# Patient Record
Sex: FEMALE | Race: ASIAN | NOT HISPANIC OR LATINO | URBAN - METROPOLITAN AREA
[De-identification: names, ages, dates, MRNs, and addresses within clinical notes are randomized per-mention and may not be internally consistent; named-entity substitution may affect disease eponyms.]

---

## 2024-03-17 ENCOUNTER — EMERGENCY (EMERGENCY)
Facility: HOSPITAL | Age: 44
LOS: 1 days | Discharge: HOME | End: 2024-03-17
Attending: STUDENT IN AN ORGANIZED HEALTH CARE EDUCATION/TRAINING PROGRAM | Admitting: STUDENT IN AN ORGANIZED HEALTH CARE EDUCATION/TRAINING PROGRAM
Payer: MEDICAID

## 2024-03-17 VITALS
SYSTOLIC BLOOD PRESSURE: 157 MMHG | TEMPERATURE: 98 F | DIASTOLIC BLOOD PRESSURE: 92 MMHG | RESPIRATION RATE: 22 BRPM | OXYGEN SATURATION: 100 % | HEART RATE: 128 BPM

## 2024-03-17 PROCEDURE — 93010 ELECTROCARDIOGRAM REPORT: CPT

## 2024-03-17 PROCEDURE — 71046 X-RAY EXAM CHEST 2 VIEWS: CPT | Mod: 26

## 2024-03-17 PROCEDURE — 99285 EMERGENCY DEPT VISIT HI MDM: CPT

## 2024-03-17 RX ORDER — FAMOTIDINE 10 MG/ML
20 INJECTION INTRAVENOUS ONCE
Refills: 0 | Status: COMPLETED | OUTPATIENT
Start: 2024-03-17 | End: 2024-03-17

## 2024-03-17 RX ORDER — SODIUM CHLORIDE 9 MG/ML
1000 INJECTION, SOLUTION INTRAVENOUS ONCE
Refills: 0 | Status: COMPLETED | OUTPATIENT
Start: 2024-03-17 | End: 2024-03-17

## 2024-03-17 RX ADMIN — SODIUM CHLORIDE 1000 MILLILITER(S): 9 INJECTION, SOLUTION INTRAVENOUS at 23:35

## 2024-03-17 RX ADMIN — FAMOTIDINE 20 MILLIGRAM(S): 10 INJECTION INTRAVENOUS at 23:52

## 2024-03-17 NOTE — ED PROVIDER NOTE - ATTENDING APP SHARED VISIT CONTRIBUTION OF CARE
45 yo F hx HTN, HLD, DM2, presenting with complaints of chest pain, palpitations and dizziness today. Denies fevers, chills, nausea, vomiting, abdominal pain.     VITALS: reviewed  GEN: NAD, A & O x 4  HEAD/EYES: NCAT, EOMI, anicteric sclerae,   ENT: mucus membranes moist, oropharynx WNL, trachea midline,  RESP: lungs CTA with equal breath sounds bilaterally, chest wall nontender and atraumatic  CV: heart with reg rhythm S1, S2, distal pulses intact and symmetric bilaterally  ABDOMEN: normoactive bowel sounds, soft, nondistended, nontender, no palpable masses  : no CVAT  MSK: extremities atraumatic and nontender, no edema, no asymmetry.  SKIN: warm, dry, no rash, no bruising, no cyanosis. color appropriate for ethnicity  NEURO: alert, mentating appropriately, no facial asymmetry.   PSYCH: Affect appropriate    Low suspicion for ACS, no PE rf, no infectious symptoms or cough to suggest pna. Plan for labs (including lipase and troponin), cxr, ekg, ruq us r/o biliary obstruction, fluids, meds and reassess.

## 2024-03-17 NOTE — ED PROVIDER NOTE - OBJECTIVE STATEMENT
43 yo F with PMH of NIDDM T2, HTN, HLD, presents to ED c/o midsternal chest pain a/w palpitation and dizziness today. Reports symptoms started after eating fried lentil w/ onion at family dinner. States initially feeling unwell w/ dizziness w/ nausea, took orange juice and sugar w/ improvement. Reports having midsternal chest pain, constant, non-radiating, a/w sweating, palpitation and sob. Denies any aggravating factors. Pt denies any associated symptoms, including f/c/v, near syncope, cough, weakness, any recent trauma or injury to chest, lower extremity edema. Denies any prior history of DVT/PE, hx of malignancy or known hypercoagulable state. Denies any early family history of cardiac death or MI. Admits she had regular check up w/ cardiologist including EKG and ultrasound a few months ago.

## 2024-03-17 NOTE — ED PROVIDER NOTE - PATIENT PORTAL LINK FT
You can access the FollowMyHealth Patient Portal offered by [facility] by registering at the following website: [email or web address]. By joining [employer]’s FollowMyHealth portal, you will also be able to view your health information using other applications (apps) compatible with our system.

## 2024-03-17 NOTE — ED PROVIDER NOTE - PROGRESS NOTE DETAILS
PA LOGAN: Patient reassessed, sitting comfortably in chair in NAD, denies any complaints. States feeling better, symptoms improved. trop <6, <6, CXR unremarkable, lactate trending down after 2L bolus IVF, lipase wnl, bili wnl, LFTs wnl.  improved. Discussed with attending, Dr. Orlando, patient can be discharged home to f/u with PCP and cardiologist. The patient was given verbal and written discharge instructions. Specifically, instructions when to return to the ED and when to seek follow-up from their pcp was discussed. Any specialty follow-up was discussed, including how to make an appointment.  Instructions were discussed in simple, plain language and was understood by the patient. The patient understands that should their symptoms worsen or any new symptoms arise, they should return to the ED immediately for further evaluation. All pt's questions were answered. Patient verbalizes understanding.

## 2024-03-17 NOTE — ED ADULT TRIAGE NOTE - CHIEF COMPLAINT QUOTE
Pt with past history of DM type 2, and HTN comes in c/o CP, palpitation, SOB since an hour ago. HR is 128. Pt feels like she is going to pass out. Will obtain EKG.

## 2024-03-17 NOTE — ED PROVIDER NOTE - SEVERE SEPSIS ALERT DETAILS
pt appears to be dehydrated, fasting since Tuesday until today x6 days, also on Metformin. Pt has elevated lactate 4.7. Pt has no viral symptoms, no other complaints to suggest infectious etiology at this time. Will hydrate and repeat lactate.

## 2024-03-17 NOTE — ED PROVIDER NOTE - CLINICAL SUMMARY MEDICAL DECISION MAKING FREE TEXT BOX
43 yo F with PMH of NIDDM T2, HTN, HLD, presents to ED c/o midsternal chest pain a/w palpitation and dizziness today. well appearing female. EKG w/ sinus tachycardia. suspect possible GI etiology including biliary colic. The patient's risk factors for ACS were reviewed as well as the EKG.  The CXR assists in r/o Pneumonia, Pneumothorax, Esophageal Tears.  The patient does not appear to have a Pulmonary Embolism based on the Wells Score and there is no apparent DVT. There is no fever.  There does not appear to be an Aortic Dissection either based on history, physical exam, and signs. Plan: labs, trop, lipase, CXR, PEPCID for gas, and reassess for further imaging for RUQ, CDU vs cardiology follow up.

## 2024-03-17 NOTE — ED ADULT NURSE NOTE - OBJECTIVE STATEMENT
Pt endorses around 730pm last night, she started feeling palpations, w/ dizziness and diaphoresis. Pt is currently well appearing, nsr on monitor. Pt endorses around 730pm last night, she started feeling palpations, w/ dizziness and diaphoresis. Pt states she has been fasting. Pt is currently well appearing, nsr on monitor. no

## 2024-03-17 NOTE — ED ADULT NURSE NOTE - NSFALLUNIVINTERV_ED_ALL_ED
Bed/Stretcher in lowest position, wheels locked, appropriate side rails in place/Call bell, personal items and telephone in reach/Instruct patient to call for assistance before getting out of bed/chair/stretcher/Non-slip footwear applied when patient is off stretcher/[place] to call system/Physically safe environment - no spills, clutter or unnecessary equipment/Purposeful proactive rounding/Room/bathroom lighting operational, light cord in reach

## 2024-03-17 NOTE — ED PROVIDER NOTE - NSFOLLOWUPINSTRUCTIONS_ED_ALL_ED_FT
Rest, drink plenty of fluids.  Advance activity as tolerated.  Continue all previously prescribed medications as directed.  Follow up with your primary care physician and cardiologist in 48-72 hours- bring copies of your results. Call on referral list given for next available appointment. Return to the ER for worsening or persistent symptoms, and/or ANY NEW OR CONCERNING SYMPTOMS. If you have issues obtaining follow up, please call: [phone] to obtain a doctor or specialist who takes your insurance in your area.

## 2024-03-18 VITALS
OXYGEN SATURATION: 98 % | TEMPERATURE: 98 F | DIASTOLIC BLOOD PRESSURE: 78 MMHG | SYSTOLIC BLOOD PRESSURE: 121 MMHG | RESPIRATION RATE: 17 BRPM | HEART RATE: 90 BPM

## 2024-03-18 LAB
ALBUMIN SERPL ELPH-MCNC: 4.3 G/DL — SIGNIFICANT CHANGE UP (ref 3.3–5)
ALP SERPL-CCNC: 61 U/L — SIGNIFICANT CHANGE UP (ref 40–120)
ALT FLD-CCNC: 21 U/L — SIGNIFICANT CHANGE UP (ref 4–33)
ANION GAP SERPL CALC-SCNC: 13 MMOL/L — SIGNIFICANT CHANGE UP (ref 7–14)
APTT BLD: 22.9 SEC — LOW (ref 24.5–35.6)
AST SERPL-CCNC: 21 U/L — SIGNIFICANT CHANGE UP (ref 4–32)
BASE EXCESS BLDV CALC-SCNC: -3.6 MMOL/L — LOW (ref -2–3)
BASE EXCESS BLDV CALC-SCNC: -4.5 MMOL/L — LOW (ref -2–3)
BASOPHILS # BLD AUTO: 0.03 K/UL — SIGNIFICANT CHANGE UP (ref 0–0.2)
BASOPHILS NFR BLD AUTO: 0.3 % — SIGNIFICANT CHANGE UP (ref 0–2)
BILIRUB SERPL-MCNC: 0.4 MG/DL — SIGNIFICANT CHANGE UP (ref 0.2–1.2)
BLOOD GAS VENOUS COMPREHENSIVE RESULT: SIGNIFICANT CHANGE UP
BLOOD GAS VENOUS COMPREHENSIVE RESULT: SIGNIFICANT CHANGE UP
BUN SERPL-MCNC: 13 MG/DL — SIGNIFICANT CHANGE UP (ref 7–23)
CALCIUM SERPL-MCNC: 8.8 MG/DL — SIGNIFICANT CHANGE UP (ref 8.4–10.5)
CHLORIDE BLDV-SCNC: 102 MMOL/L — SIGNIFICANT CHANGE UP (ref 96–108)
CHLORIDE BLDV-SCNC: 107 MMOL/L — SIGNIFICANT CHANGE UP (ref 96–108)
CHLORIDE SERPL-SCNC: 103 MMOL/L — SIGNIFICANT CHANGE UP (ref 98–107)
CO2 BLDV-SCNC: 22.3 MMOL/L — SIGNIFICANT CHANGE UP (ref 22–26)
CO2 BLDV-SCNC: 23.4 MMOL/L — SIGNIFICANT CHANGE UP (ref 22–26)
CO2 SERPL-SCNC: 19 MMOL/L — LOW (ref 22–31)
CREAT SERPL-MCNC: 0.34 MG/DL — LOW (ref 0.5–1.3)
EGFR: 130 ML/MIN/1.73M2 — SIGNIFICANT CHANGE UP
EOSINOPHIL # BLD AUTO: 0.25 K/UL — SIGNIFICANT CHANGE UP (ref 0–0.5)
EOSINOPHIL NFR BLD AUTO: 2.1 % — SIGNIFICANT CHANGE UP (ref 0–6)
GAS PNL BLDV: 134 MMOL/L — LOW (ref 136–145)
GAS PNL BLDV: 136 MMOL/L — SIGNIFICANT CHANGE UP (ref 136–145)
GAS PNL BLDV: SIGNIFICANT CHANGE UP
GLUCOSE BLDV-MCNC: 198 MG/DL — HIGH (ref 70–99)
GLUCOSE BLDV-MCNC: 372 MG/DL — HIGH (ref 70–99)
GLUCOSE SERPL-MCNC: 358 MG/DL — HIGH (ref 70–99)
HCO3 BLDV-SCNC: 21 MMOL/L — LOW (ref 22–29)
HCO3 BLDV-SCNC: 22 MMOL/L — SIGNIFICANT CHANGE UP (ref 22–29)
HCT VFR BLD CALC: 34.1 % — LOW (ref 34.5–45)
HCT VFR BLDA CALC: 30 % — LOW (ref 34.5–46.5)
HCT VFR BLDA CALC: 33 % — LOW (ref 34.5–46.5)
HGB BLD CALC-MCNC: 11 G/DL — LOW (ref 11.7–16.1)
HGB BLD CALC-MCNC: 9.9 G/DL — LOW (ref 11.7–16.1)
HGB BLD-MCNC: 10.6 G/DL — LOW (ref 11.5–15.5)
IANC: 9.4 K/UL — HIGH (ref 1.8–7.4)
IMM GRANULOCYTES NFR BLD AUTO: 0.5 % — SIGNIFICANT CHANGE UP (ref 0–0.9)
INR BLD: <0.9 RATIO — SIGNIFICANT CHANGE UP (ref 0.85–1.18)
LACTATE BLDV-MCNC: 3.6 MMOL/L — HIGH (ref 0.5–2)
LACTATE BLDV-MCNC: 4.7 MMOL/L — CRITICAL HIGH (ref 0.5–2)
LIDOCAIN IGE QN: 23 U/L — SIGNIFICANT CHANGE UP (ref 7–60)
LYMPHOCYTES # BLD AUTO: 1.56 K/UL — SIGNIFICANT CHANGE UP (ref 1–3.3)
LYMPHOCYTES # BLD AUTO: 13.2 % — SIGNIFICANT CHANGE UP (ref 13–44)
MCHC RBC-ENTMCNC: 23.5 PG — LOW (ref 27–34)
MCHC RBC-ENTMCNC: 31.1 GM/DL — LOW (ref 32–36)
MCV RBC AUTO: 75.6 FL — LOW (ref 80–100)
MONOCYTES # BLD AUTO: 0.53 K/UL — SIGNIFICANT CHANGE UP (ref 0–0.9)
MONOCYTES NFR BLD AUTO: 4.5 % — SIGNIFICANT CHANGE UP (ref 2–14)
NEUTROPHILS # BLD AUTO: 9.4 K/UL — HIGH (ref 1.8–7.4)
NEUTROPHILS NFR BLD AUTO: 79.4 % — HIGH (ref 43–77)
NRBC # BLD: 0 /100 WBCS — SIGNIFICANT CHANGE UP (ref 0–0)
NRBC # FLD: 0 K/UL — SIGNIFICANT CHANGE UP (ref 0–0)
PCO2 BLDV: 40 MMHG — SIGNIFICANT CHANGE UP (ref 39–52)
PCO2 BLDV: 42 MMHG — SIGNIFICANT CHANGE UP (ref 39–52)
PH BLDV: 7.33 — SIGNIFICANT CHANGE UP (ref 7.32–7.43)
PH BLDV: 7.33 — SIGNIFICANT CHANGE UP (ref 7.32–7.43)
PLATELET # BLD AUTO: 356 K/UL — SIGNIFICANT CHANGE UP (ref 150–400)
PO2 BLDV: 49 MMHG — HIGH (ref 25–45)
PO2 BLDV: 55 MMHG — HIGH (ref 25–45)
POTASSIUM BLDV-SCNC: 4.3 MMOL/L — SIGNIFICANT CHANGE UP (ref 3.5–5.1)
POTASSIUM BLDV-SCNC: 4.4 MMOL/L — SIGNIFICANT CHANGE UP (ref 3.5–5.1)
POTASSIUM SERPL-MCNC: 4.8 MMOL/L — SIGNIFICANT CHANGE UP (ref 3.5–5.3)
POTASSIUM SERPL-SCNC: 4.8 MMOL/L — SIGNIFICANT CHANGE UP (ref 3.5–5.3)
PROT SERPL-MCNC: 7.2 G/DL — SIGNIFICANT CHANGE UP (ref 6–8.3)
PROTHROM AB SERPL-ACNC: 10 SEC — SIGNIFICANT CHANGE UP (ref 9.5–13)
RBC # BLD: 4.51 M/UL — SIGNIFICANT CHANGE UP (ref 3.8–5.2)
RBC # FLD: 14.9 % — HIGH (ref 10.3–14.5)
SAO2 % BLDV: 78 % — SIGNIFICANT CHANGE UP (ref 67–88)
SAO2 % BLDV: 83.3 % — SIGNIFICANT CHANGE UP (ref 67–88)
SODIUM SERPL-SCNC: 135 MMOL/L — SIGNIFICANT CHANGE UP (ref 135–145)
TROPONIN T, HIGH SENSITIVITY RESULT: <6 NG/L — SIGNIFICANT CHANGE UP
TROPONIN T, HIGH SENSITIVITY RESULT: <6 NG/L — SIGNIFICANT CHANGE UP
TSH SERPL-MCNC: 1.69 UIU/ML — SIGNIFICANT CHANGE UP (ref 0.27–4.2)
WBC # BLD: 11.83 K/UL — HIGH (ref 3.8–10.5)
WBC # FLD AUTO: 11.83 K/UL — HIGH (ref 3.8–10.5)

## 2024-03-18 RX ORDER — SODIUM CHLORIDE 9 MG/ML
1000 INJECTION, SOLUTION INTRAVENOUS ONCE
Refills: 0 | Status: COMPLETED | OUTPATIENT
Start: 2024-03-18 | End: 2024-03-18

## 2024-03-18 RX ADMIN — SODIUM CHLORIDE 1000 MILLILITER(S): 9 INJECTION, SOLUTION INTRAVENOUS at 00:39

## 2024-05-17 ENCOUNTER — EMERGENCY (EMERGENCY)
Facility: HOSPITAL | Age: 44
LOS: 1 days | Discharge: HOME | End: 2024-05-17
Attending: EMERGENCY MEDICINE | Admitting: EMERGENCY MEDICINE
Payer: MEDICAID

## 2024-05-17 VITALS
RESPIRATION RATE: 18 BRPM | WEIGHT: 151.9 LBS | DIASTOLIC BLOOD PRESSURE: 83 MMHG | HEART RATE: 80 BPM | OXYGEN SATURATION: 100 % | SYSTOLIC BLOOD PRESSURE: 134 MMHG | TEMPERATURE: 98 F

## 2024-05-17 PROCEDURE — 99284 EMERGENCY DEPT VISIT MOD MDM: CPT

## 2024-05-17 NOTE — ED ADULT TRIAGE NOTE - CHIEF COMPLAINT QUOTE
complaining 3x days of redness, swelling and " pus pimple" to L jaw. took tylenol within the hour. denies recent dental work, fevers, chills was given antibiotics with no relief  medical history DM2, HTN, HLD

## 2024-05-18 VITALS
TEMPERATURE: 99 F | HEART RATE: 88 BPM | DIASTOLIC BLOOD PRESSURE: 85 MMHG | SYSTOLIC BLOOD PRESSURE: 133 MMHG | RESPIRATION RATE: 16 BRPM | OXYGEN SATURATION: 100 %

## 2024-05-18 PROBLEM — I10 ESSENTIAL (PRIMARY) HYPERTENSION: Chronic | Status: ACTIVE | Noted: 2024-03-17

## 2024-05-18 PROBLEM — E11.9 TYPE 2 DIABETES MELLITUS WITHOUT COMPLICATIONS: Chronic | Status: ACTIVE | Noted: 2024-03-17

## 2024-05-18 PROBLEM — E78.5 HYPERLIPIDEMIA, UNSPECIFIED: Chronic | Status: ACTIVE | Noted: 2024-03-17

## 2024-05-18 RX ORDER — MUPIROCIN 20 MG/G
1 OINTMENT TOPICAL
Qty: 1 | Refills: 0
Start: 2024-05-18 | End: 2024-05-22

## 2024-05-18 RX ORDER — CEPHALEXIN 500 MG
1 CAPSULE ORAL
Qty: 20 | Refills: 0
Start: 2024-05-18 | End: 2024-05-22

## 2024-05-18 RX ORDER — CEPHALEXIN 500 MG
500 CAPSULE ORAL ONCE
Refills: 0 | Status: COMPLETED | OUTPATIENT
Start: 2024-05-18 | End: 2024-05-18

## 2024-05-18 RX ORDER — MUPIROCIN 20 MG/G
1 OINTMENT TOPICAL ONCE
Refills: 0 | Status: COMPLETED | OUTPATIENT
Start: 2024-05-18 | End: 2024-05-18

## 2024-05-18 RX ORDER — KETOROLAC TROMETHAMINE 30 MG/ML
15 SYRINGE (ML) INJECTION ONCE
Refills: 0 | Status: DISCONTINUED | OUTPATIENT
Start: 2024-05-18 | End: 2024-05-18

## 2024-05-18 RX ORDER — KETOROLAC TROMETHAMINE 30 MG/ML
30 SYRINGE (ML) INJECTION ONCE
Refills: 0 | Status: DISCONTINUED | OUTPATIENT
Start: 2024-05-18 | End: 2024-05-18

## 2024-05-18 RX ORDER — ACETAMINOPHEN 500 MG
650 TABLET ORAL ONCE
Refills: 0 | Status: COMPLETED | OUTPATIENT
Start: 2024-05-18 | End: 2024-05-18

## 2024-05-18 RX ADMIN — Medication 30 MILLIGRAM(S): at 01:17

## 2024-05-18 RX ADMIN — Medication 650 MILLIGRAM(S): at 01:22

## 2024-05-18 RX ADMIN — MUPIROCIN 1 APPLICATION(S): 20 OINTMENT TOPICAL at 01:43

## 2024-05-18 RX ADMIN — Medication 500 MILLIGRAM(S): at 01:16

## 2024-05-18 NOTE — ED PROVIDER NOTE - PROGRESS NOTE DETAILS
Ko Jackson MD PGY2: pt feels improved with meds. tolerates po well. discussed meds, dc, follow up, return precautions. understands and is amenable at this time. Graft Cartilage Fenestration Text: The cartilage was fenestrated with a 2mm punch biopsy to help facilitate graft survival and healing.

## 2024-05-18 NOTE — ED PROVIDER NOTE - OBJECTIVE STATEMENT
Patient is a 44-year-old female past medical history hypertension, hyperlipidemia, diabetes presenting for left lower lip infection.  Patient states for the past 3 days she has had redness and swelling to the left lateral aspect of her lower lip, with yellow crusting overlying it and some pus drainage.  Feels that it is painful, preventing her from trying to eat because of the pain.  No pain in the gums or teeth, no pain in the neck.  Does not feel that there is any swelling on the inside of her mouth or throat, no difficulty breathing or swallowing.  No fevers.  Went to her primary care doctor yesterday and was prescribed Augmentin and Flagyl, began taking it last night for total of 3 doses.  Feels that it is somewhat improved but still having significant pain.  No other cough, sore throat, sick contacts, recent travel, difficulty breathing, chest pain, abdominal pain, urine or stool change.  Took 1 tablet Tylenol prior to arrival with some improvement to pain.

## 2024-05-18 NOTE — ED PROVIDER NOTE - CLINICAL SUMMARY MEDICAL DECISION MAKING FREE TEXT BOX
Patient is a 44-year-old female past medical history hypertension, hyperlipidemia, diabetes presenting for left lower lip infection. With vital signs currently within normal limits and stable.  With 3 days left lower lip cellulitis with yellow crusting suggestive of gram-positive/staphylococcal infection.  No floor of mouth or other neck changes suggestive of Dao angina or other invasive infection, no gum changes or tooth changes.  To give pain control, antibiotics, reassess, likely DC with follow-up.

## 2024-05-18 NOTE — ED PROVIDER NOTE - PHYSICAL EXAMINATION
CONSTITUTIONAL: awake; in no acute distress.   SKIN: L lower lip erythema and yellow crusting, no palpable nodules.  HEAD: Normocephalic; atraumatic.  EYES: no conjunctival injection. PERRL.   ENT: No nasal discharge; airway clear. No gum or tooth changes. No oropharyngeal changes  NECK: Supple; non tender. Good rom. No overlying skin changes to neck or swelling.  CARD: S1, S2 normal; no murmurs, gallops, or rubs. Regular rate and rhythm.   RESP: No wheezes, rales or rhonchi. Good air movement bilaterally.   ABD: soft ntnd, no guarding, no distention, no rigidity.   EXT: Ambulates independently.  No cyanosis or edema.   LYMPH: No acute cervical adenopathy.  NEURO: Alert, oriented, grossly unremarkable  PSYCH: Cooperative, appropriate.

## 2024-05-18 NOTE — ED PROVIDER NOTE - ATTENDING CONTRIBUTION TO CARE
43 y/o F with h/o hyperlipidemia, HTN, DM p/w redness, swelling, discharge and pain to L lower lip.  Pt saw her PMD Thursday and was started on augmentin and flagyl.  Pt has taken abx x 1 day.  Cont to have pain to the site.  No new sxs.  No fever, sob, difficulty swallowing, voice changes, dental pain.    Well appearing, sitting comfortably in stretcher, awake and alert, nontoxic.  AF/VSS.  NCAT (+)swelling, induration, redness and tenderness to L lower buccal mucosa and lip with crusty yellow discharge.  No intraoral lesions, tongue elevation, sublingual swelling.  Post oropharynx is clear, uvula midline, no malocclusion or trismus.  No voice muffling.    Exam is suggestive of impetigo.  Will change abx to keflex and mupirocin topical, pain meds, cont warm compresses at home, outpatient f/u for wound check.

## 2024-05-18 NOTE — ED ADULT NURSE NOTE - OBJECTIVE STATEMENT
Received the patient in spot 24A with c/o redness, swelling and pimple like appearance on the left corner of lower lip. Patient c/o pain on this area. Not in acute distress. alert and oriented x 4, safety maintained. Not in acute distress. resps are even and non labored.  Meds given as per order.  Nursing care is ongoing.

## 2024-05-18 NOTE — ED PROVIDER NOTE - PATIENT PORTAL LINK FT
You can access the FollowMyHealth Patient Portal offered by [facility] by registering at the following website: [email or web address]. By joining [employer]’s FollowMyHealth portal, you will also be able to view your health information using other applications (apps) compatible with our system.

## 2024-05-18 NOTE — ED PROVIDER NOTE - NSFOLLOWUPINSTRUCTIONS_ED_ALL_ED_FT
You were seen in the ED for infection of the skin of your lip    Your evaluation showed no findings requiring further evaluation or treatment in the hospital at this time.    You were prescribed antibiotics for your infection.  Please take Keflex 1 tablet 4 times per day and finish your course even if you feel better.  Please apply the mupirocin ointment to the affected area 2-3 times per day for 5 days.    You may take Tylenol up to 1000mg every 6 hours as needed for pain.  You may take Ibuprofen up to 400mg every 6 hours as needed for pain.    Please follow up with your PCP as discussed within 1 week.  Discuss your symptoms and medications.    Seek immediate medical attention if you experience new or worsening symptoms.  This includes swelling or pain to your mouth or throat, difficulty breathing, inability to eat.

## 2024-10-22 ENCOUNTER — EMERGENCY (EMERGENCY)
Facility: HOSPITAL | Age: 44
LOS: 1 days | Discharge: HOME | End: 2024-10-22
Admitting: EMERGENCY MEDICINE
Payer: MEDICAID

## 2024-10-22 VITALS
SYSTOLIC BLOOD PRESSURE: 124 MMHG | TEMPERATURE: 98 F | OXYGEN SATURATION: 99 % | RESPIRATION RATE: 17 BRPM | DIASTOLIC BLOOD PRESSURE: 77 MMHG | HEART RATE: 76 BPM

## 2024-10-22 VITALS
TEMPERATURE: 98 F | HEIGHT: 60 IN | SYSTOLIC BLOOD PRESSURE: 136 MMHG | OXYGEN SATURATION: 98 % | RESPIRATION RATE: 15 BRPM | WEIGHT: 160.06 LBS | DIASTOLIC BLOOD PRESSURE: 85 MMHG | HEART RATE: 82 BPM

## 2024-10-22 LAB
ALBUMIN SERPL ELPH-MCNC: 4.1 G/DL — SIGNIFICANT CHANGE UP (ref 3.3–5)
ALP SERPL-CCNC: 62 U/L — SIGNIFICANT CHANGE UP (ref 40–120)
ALT FLD-CCNC: 22 U/L — SIGNIFICANT CHANGE UP (ref 4–33)
ANION GAP SERPL CALC-SCNC: 13 MMOL/L — SIGNIFICANT CHANGE UP (ref 7–14)
APPEARANCE UR: CLEAR — SIGNIFICANT CHANGE UP
AST SERPL-CCNC: 20 U/L — SIGNIFICANT CHANGE UP (ref 4–32)
BACTERIA # UR AUTO: NEGATIVE /HPF — SIGNIFICANT CHANGE UP
BASOPHILS # BLD AUTO: 0.03 K/UL — SIGNIFICANT CHANGE UP (ref 0–0.2)
BASOPHILS NFR BLD AUTO: 0.4 % — SIGNIFICANT CHANGE UP (ref 0–2)
BILIRUB SERPL-MCNC: 0.3 MG/DL — SIGNIFICANT CHANGE UP (ref 0.2–1.2)
BILIRUB UR-MCNC: NEGATIVE — SIGNIFICANT CHANGE UP
BUN SERPL-MCNC: 11 MG/DL — SIGNIFICANT CHANGE UP (ref 7–23)
CALCIUM SERPL-MCNC: 9.7 MG/DL — SIGNIFICANT CHANGE UP (ref 8.4–10.5)
CAST: 0 /LPF — SIGNIFICANT CHANGE UP (ref 0–4)
CHLORIDE SERPL-SCNC: 103 MMOL/L — SIGNIFICANT CHANGE UP (ref 98–107)
CO2 SERPL-SCNC: 21 MMOL/L — LOW (ref 22–31)
COLOR SPEC: YELLOW — SIGNIFICANT CHANGE UP
CREAT SERPL-MCNC: 0.38 MG/DL — LOW (ref 0.5–1.3)
DIFF PNL FLD: ABNORMAL
EGFR: 127 ML/MIN/1.73M2 — SIGNIFICANT CHANGE UP
EOSINOPHIL # BLD AUTO: 0.31 K/UL — SIGNIFICANT CHANGE UP (ref 0–0.5)
EOSINOPHIL NFR BLD AUTO: 3.9 % — SIGNIFICANT CHANGE UP (ref 0–6)
GLUCOSE SERPL-MCNC: 169 MG/DL — HIGH (ref 70–99)
GLUCOSE UR QL: 500 MG/DL
HCT VFR BLD CALC: 35.1 % — SIGNIFICANT CHANGE UP (ref 34.5–45)
HGB BLD-MCNC: 10.7 G/DL — LOW (ref 11.5–15.5)
IANC: 3.53 K/UL — SIGNIFICANT CHANGE UP (ref 1.8–7.4)
IMM GRANULOCYTES NFR BLD AUTO: 0.2 % — SIGNIFICANT CHANGE UP (ref 0–0.9)
KETONES UR-MCNC: NEGATIVE MG/DL — SIGNIFICANT CHANGE UP
LEUKOCYTE ESTERASE UR-ACNC: NEGATIVE — SIGNIFICANT CHANGE UP
LYMPHOCYTES # BLD AUTO: 3.59 K/UL — HIGH (ref 1–3.3)
LYMPHOCYTES # BLD AUTO: 44.8 % — HIGH (ref 13–44)
MCHC RBC-ENTMCNC: 23.1 PG — LOW (ref 27–34)
MCHC RBC-ENTMCNC: 30.5 GM/DL — LOW (ref 32–36)
MCV RBC AUTO: 75.8 FL — LOW (ref 80–100)
MONOCYTES # BLD AUTO: 0.53 K/UL — SIGNIFICANT CHANGE UP (ref 0–0.9)
MONOCYTES NFR BLD AUTO: 6.6 % — SIGNIFICANT CHANGE UP (ref 2–14)
NEUTROPHILS # BLD AUTO: 3.53 K/UL — SIGNIFICANT CHANGE UP (ref 1.8–7.4)
NEUTROPHILS NFR BLD AUTO: 44.1 % — SIGNIFICANT CHANGE UP (ref 43–77)
NITRITE UR-MCNC: NEGATIVE — SIGNIFICANT CHANGE UP
NRBC # BLD: 0 /100 WBCS — SIGNIFICANT CHANGE UP (ref 0–0)
NRBC # FLD: 0 K/UL — SIGNIFICANT CHANGE UP (ref 0–0)
PH UR: 6.5 — SIGNIFICANT CHANGE UP (ref 5–8)
PLATELET # BLD AUTO: 340 K/UL — SIGNIFICANT CHANGE UP (ref 150–400)
POTASSIUM SERPL-MCNC: 4.2 MMOL/L — SIGNIFICANT CHANGE UP (ref 3.5–5.3)
POTASSIUM SERPL-SCNC: 4.2 MMOL/L — SIGNIFICANT CHANGE UP (ref 3.5–5.3)
PROT SERPL-MCNC: 7.7 G/DL — SIGNIFICANT CHANGE UP (ref 6–8.3)
PROT UR-MCNC: NEGATIVE MG/DL — SIGNIFICANT CHANGE UP
RBC # BLD: 4.63 M/UL — SIGNIFICANT CHANGE UP (ref 3.8–5.2)
RBC # FLD: 15.1 % — HIGH (ref 10.3–14.5)
RBC CASTS # UR COMP ASSIST: 44 /HPF — HIGH (ref 0–4)
SODIUM SERPL-SCNC: 137 MMOL/L — SIGNIFICANT CHANGE UP (ref 135–145)
SP GR SPEC: 1.01 — SIGNIFICANT CHANGE UP (ref 1–1.03)
SQUAMOUS # UR AUTO: 1 /HPF — SIGNIFICANT CHANGE UP (ref 0–5)
UROBILINOGEN FLD QL: 0.2 MG/DL — SIGNIFICANT CHANGE UP (ref 0.2–1)
WBC # BLD: 8.01 K/UL — SIGNIFICANT CHANGE UP (ref 3.8–10.5)
WBC # FLD AUTO: 8.01 K/UL — SIGNIFICANT CHANGE UP (ref 3.8–10.5)
WBC UR QL: 0 /HPF — SIGNIFICANT CHANGE UP (ref 0–5)

## 2024-10-22 PROCEDURE — 99284 EMERGENCY DEPT VISIT MOD MDM: CPT

## 2024-10-22 RX ORDER — CYCLOBENZAPRINE HCL 10 MG
1 TABLET ORAL
Qty: 21 | Refills: 0
Start: 2024-10-22 | End: 2024-10-28

## 2024-10-22 RX ORDER — KETOROLAC TROMETHAMINE 10 MG/1
15 TABLET, FILM COATED ORAL ONCE
Refills: 0 | Status: DISCONTINUED | OUTPATIENT
Start: 2024-10-22 | End: 2024-10-22

## 2024-10-22 RX ORDER — LIDOCAINE 50 MG/G
1 CREAM TOPICAL ONCE
Refills: 0 | Status: COMPLETED | OUTPATIENT
Start: 2024-10-22 | End: 2024-10-22

## 2024-10-22 RX ADMIN — LIDOCAINE 1 PATCH: 50 CREAM TOPICAL at 21:39

## 2024-10-22 RX ADMIN — KETOROLAC TROMETHAMINE 15 MILLIGRAM(S): 10 TABLET, FILM COATED ORAL at 21:38

## 2024-10-22 NOTE — ED PROVIDER NOTE - PROGRESS NOTE DETAILS
PA LOGAN: labs reviewed. ua negative. Patient reassessed, sitting comfortably in chair in NAD, denies any complaints. States feeling better, symptoms improved. Pt is medically stable for discharge and follow up with PMD and orthopedic. The patient was given verbal and written discharge instructions. Specifically, instructions when to return to the ED and when to seek follow-up from their pcp was discussed. Any specialty follow-up was discussed, including how to make an appointment.  Instructions were discussed in simple, plain language and was understood by the patient. The patient understands that should their symptoms worsen or any new symptoms arise, they should return to the ED immediately for further evaluation. All pt's questions were answered. Patient verbalizes understanding.

## 2024-10-22 NOTE — ED ADULT NURSE NOTE - OBJECTIVE STATEMENT
Pt is alert and orientedx4, ambulatory at baseline. Pt presents to the ED with left flank pain radiating to left side. Denies dysuria or hematuria.  Pt denies chest pain, shortness of breath, dyspnea on exertion, breathing is unlabored and even. Pt denies nausea, vomiting or diarrhea. 20G IV placed in LAC, labs drawn, awaiting further orders. Call bell within reach, bed in lowest position, will continue to monitor.

## 2024-10-22 NOTE — ED PROVIDER NOTE - NS CPE EDP MUSC LUMBAR LOC
no midline spine tenderness, left paraspinal tenderness, no erythema, no edema, no obvious deformity b/l.

## 2024-10-22 NOTE — ED ADULT TRIAGE NOTE - BP NONINVASIVE SYSTOLIC (MM HG)
>>NPO instructions given per surgeons office.     -- Medication information (what to hold and what to take)   -- Arrival place and directions given; time to be given the day before procedure or Friday before (if Monday case) by the Surgeon's Office   -- Bathing with normal soap; unless otherwise stated by surgeon's office  -- Don't wear any jewelry or bring any valuables AM of surgery   -- No powder, lotions, creams (except diaper rash)    Pt's mom verbalized understanding.       >>Mom denies fever or URI s/s for past 2 weeks   
136

## 2024-10-22 NOTE — ED PROVIDER NOTE - OBJECTIVE STATEMENT
43 yo F with PMH of DM, HTN, HLD, accompanied by son, presents to ED c/o lower back pain about a week, atraumatic. Reports pulling pain, 7/10, intermittent, nonradiating, worse with standing, movements. Admits taking Tylenol w/o improvement. States spoke with PCP, advised to go ED for evaluation w/ blood work. Denies hx of malignancy, unexplained weight loss, fever, rigors, malaise, recent infection, hx of IVDU, saddle anesthesia/perianal sensory loss, decreased rectal tone, urinary retention, inability to control urine from overflow, weakness, numbness, recent travel, or any other complaints.   Pt currently on her menstrual.

## 2024-10-22 NOTE — ED PROVIDER NOTE - CLINICAL SUMMARY MEDICAL DECISION MAKING FREE TEXT BOX
43 yo F with PMH of DM, HTN, HLD, accompanied by son, presents to ED c/o lower back pain about a week, atraumatic. wel appearing female. there is no fever. no red flags. pt is ambulatory. Ddx: musculoskeletal back pain, disc herniation. not consistent with sciatica. Less likely kidney stone.  Plan: labs, Ua, pain control, and likely orthopedic referral.

## 2024-10-22 NOTE — ED ADULT TRIAGE NOTE - CHIEF COMPLAINT QUOTE
pt coming with left lower back pain x 1 wk, denies at time pain rad. down the leg. fall, no dysuria,

## 2024-10-22 NOTE — ED PROVIDER NOTE - NSFOLLOWUPINSTRUCTIONS_ED_ALL_ED_FT
Follow with your PMD within 48-72 hours.    Rest, no heavy lifting.  Warm compresses to area.  Light walking.     Take Motrin 600 mg every 6-8 hours for pain with food.   Take Flexeril 10 mg every 8 hours as needed for muscle spasm -- causes drowsiness; no drinking alcohol or driving with this medication.     Any worsening pain, weakness, numbness, bowel or urinary incontinence or new concerning symptoms return to the Emergency Department.     Our Discharge Lounge will contact you for appointment with orthopedic.

## 2024-10-22 NOTE — ED PROVIDER NOTE - MUSCULOSKELETAL MINIMAL EXAM
Palliative Care Initial Visit      Patient Name: Lino Kramer  YOB: 1952  MRN: [id number]    Date of Visit: 8/31/2022   Visit Made By: CAYDEN Witt  Location or unit: Patient's Home    Primary Care Physician:   Dima Alejandro MD  Office Phone #: [phone]  Fax Phone #: [phone]    Establishing Advance Directives and Complex Decision Making  Number of hospitalizations in the last year: n/a  Last hospitalization: July 2022 [facility]    Patient Care Team:   Patient Care Team:  Dima Alejandro MD as PCP - General (Specialist)  MD Ame Dunham LPN as Care Transitions Nurse  Geneva Bobby CNP as [facility] Home: Clinician (Nurse Practitioner - Family)  Aida Griffin LCSW as [facility]: SW (Specialist)    ADVANCE DIRECTIVES:  ?  Power of  Status:  Activated  Code Status:  FULL CODE  Advanced Directive Forms: discussed    History Obtained By: HX Obtained by: Chart Review and Family     Chief Complaint   Patient presents with   • MINISTERION Initial       HPI  Lino is a 70 year old male seen today for admission to palliative care.  PMHx: CVA with R hemiplegia 2013, NPH s/p  shunt, hx of craniotomy with bone flap 2013, seizure disorder, CAD s/p bypass graft, PVD, HTN. Pt was hospitalized 7/20-7/25/2022 [facility] for UTI/HCPA/sepsis; prior was hospitalized for UTI earlier in July '22. He was dc'd home where he lives with his spouse, who is primary CG.  He follows with urology at [facility], also follows with pulmo (Nara) and cardiology (Joy).  He saw ortho 8/22 and had injection to R shoulder.  Home health recently signed off.   Present today is pt, spouse Allie; pt's son lives in the home and is present for part of encounter. Pt also has one adult daughter that is active/involved/supportive.  Palliative services explained in detail including role of palliative care in addressing symptoms, helping to avoid uneccessary hospitalizations, and  addressing goals of care; pt agreeable.  Pt stable.  Wife provides all info as pt is aphasic.  She denies any fever, chest pain, cough, congestion, SOB, v/d. Pt has occasional pain for which wife gives tylenol; no longer giving norco.  States pain is generalized, sometimes to buttocks; admits pt unable to adequately provide pain info; wife relies on nonverbal cues.  States pt generally comfortable,calm & cooperative. Appetite is good, no issues with swallowing per wife's report. Sleeps well, naps during the day.  Uses 02 at noc, not durng the day.  Moves bowels daily, no issues. Has condom catheter which wife maintains, no issues.  Pt incont of bowel, diapered. Wife able to get pt out of the house, into the car etc.  Relays that she does exercises daily with pt.  Wife monitors VS daily; BP avgs 120s/70s, 02 >95% RA during the day.  He gets BID-QID duonebs; does aerobika several times daily.  No other issues or concerns.  GOC is to remain at home; wife adamantly does not want LTC placement. She does not feel she needs any CG help at this time.  Extend life, full code status, continue to see all specialists.     Patient's medications, allergies, past medical, surgical, social and family histories were reviewed and updated as appropriate.    Review of Systems   All other systems reviewed and are negative.  Except as noted in HPI  Current Outpatient Medications   Medication Sig Dispense Refill   • isosorbide mononitrate (MONOKET) 10 MG tablet Take 10 mg by mouth in the morning and 10 mg in the evening.     • oxygen (O2) gas Inhale 2 L/min into the lungs at bedtime.     • oxygen (O2) gas Inhale 2 L/min into the lungs as needed (shortness of breath, oxygen saturation less than 88%).     • albuterol (VENTOLIN) (2.5 MG/3ML) 0.083% nebulizer solution Take 3 mLs by nebulization Every 4 hours as needed for Shortness of Breath or Wheezing. 900 mL 0   • HYDROcodone-acetaminophen (NORCO) 5-325 MG per tablet Take 1 tablet by mouth  every 4 hours as needed for Pain. 20 tablet 0   • acetaminophen (TYLENOL) 500 MG tablet Take 1,000 mg by mouth 3 times daily as needed for Pain.     • aspirin 325 MG tablet Take 325 mg by mouth daily.     • atorvastatin (LIPITOR) 40 MG tablet Take 40 mg by mouth every evening.     • cilostazol (PLETAL) 100 MG tablet Take 100 mg by mouth daily.     • cyanocobalamin 500 MCG tablet Take 500 mcg by mouth daily.     • escitalopram (LEXAPRO) 20 MG tablet Take 20 mg by mouth daily.     • folic acid (FOLATE) 1 MG tablet Take 1 mg by mouth daily.     • gabapentin (NEURONTIN) 400 MG capsule Take 400 mg by mouth in the morning and 400 mg at noon and 400 mg before bedtime.     • ipratropium-albuterol (DUONEB) 0.5-2.5 (3) MG/3ML nebulizer solution Take 3 mLs by nebulization in the morning and 3 mLs before bedtime.     • methenamine (HIPREX) 1 g tablet Take 0.5 g by mouth in the morning and 0.5 g in the evening. Take with meals.     • metoPROLOL tartrate (LOPRESSOR) 25 MG tablet Take 12.5 mg by mouth in the morning and 12.5 mg before bedtime.     • omeprazole (PrilOSEC) 20 MG capsule Take 20 mg by mouth daily.     • risperiDONE (RisperDAL) 0.25 MG tablet Take 0.25 mg by mouth daily.     • Ascorbic Acid (vitamin C) 500 MG tablet Take 500 mg by mouth daily.     • CALCIUM CARBONATE-VITAMIN D PO      • tamsulosin (FLOMAX) 0.4 MG Cap Take 1 capsule by mouth daily after a meal. Do not start before July 15, 2022. 30 capsule 0   • levETIRAcetam (KEPPRA) 750 MG tablet Take 750 mg by mouth 2 times daily.        No current facility-administered medications for this visit.     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    Visit Vitals  /70 (BP Location: LUE - Left upper extremity, Patient Position: Sitting)   Pulse 68   Resp 16   SpO2 96%       Pain Assessment  Comfort/Function Pain Score (at REST) 0    Wheelchair    Palliative Performance Scale: 40%    Wheelchair    Good     Labs & Imaging   Recent Results (from the past 1176 hour(s))   Comprehensive Metabolic Panel    Collection Time: 07/14/22  6:26 AM   Result Value Ref Range    Fasting Status      Sodium 138 135 - 145 mmol/L    Potassium 3.8 3.4 - 5.1 mmol/L    Chloride 107 97 - 110 mmol/L    Carbon Dioxide 24 21 - 32 mmol/L    Anion Gap 11 7 - 19 mmol/L    Glucose 86 70 - 99 mg/dL    BUN 14 6 - 20 mg/dL    Creatinine 1.00 0.67 - 1.17 mg/dL    Glomerular Filtration Rate 81 >=60    BUN/ Creatinine Ratio 14 7 - 25    Calcium 9.0 8.4 - 10.2 mg/dL    Bilirubin, Total 0.2 0.2 - 1.0 mg/dL    GOT/AST 34 <=37 Units/L    GPT/ALT 54 <64 Units/L    Alkaline Phosphatase 54 45 - 117 Units/L    Albumin 2.5 (L) 3.6 - 5.1 g/dL    Protein, Total 7.1 6.4 - 8.2 g/dL    Globulin 4.6 (H) 2.0 - 4.0 g/dL    A/G Ratio 0.5 (L) 1.0 - 2.4   CBC with Automated Differential (performable only)    Collection Time: 07/14/22  6:26 AM   Result Value Ref Range    WBC 6.0 4.2 - 11.0 K/mcL    RBC 3.27 (L) 4.50 - 5.90 mil/mcL    HGB 11.2 (L) 13.0 - 17.0 g/dL    HCT 33.6 (L) 39.0 - 51.0 %    .8 (H) 78.0 - 100.0 fl    MCH 34.3 (H) 26.0 - 34.0 pg    MCHC 33.3 32.0 -  36.5 g/dL    RDW-CV 13.4 11.0 - 15.0 %    RDW-SD 50.8 (H) 39.0 - 50.0 fL     140 - 450 K/mcL    NRBC 0 <=0 /100 WBC    Neutrophil, Percent 57 %    Lymphocytes, Percent 30 %    Mono, Percent 10 %    Eosinophils, Percent 3 %    Basophils, Percent 0 %    Immature Granulocytes 0 %    Absolute Neutrophils 3.4 1.8 - 7.7 K/mcL    Absolute Lymphocytes 1.8 1.0 - 4.0 K/mcL    Absolute Monocytes 0.6 0.3 - 0.9 K/mcL    Absolute Eosinophils  0.2 0.0 - 0.5 K/mcL    Absolute Basophils 0.0 0.0 - 0.3 K/mcL    Absolute Immmature Granulocytes 0.0 0.0 - 0.2 K/mcL   GLUCOSE, BEDSIDE - POINT OF CARE    Collection Time: 07/19/22 10:41 PM   Result Value Ref Range    GLUCOSE, BEDSIDE - POINT OF CARE 141 (H) 70 - 99 mg/dL   Light Blue Top    Collection Time: 07/19/22 10:42 PM   Result Value Ref Range    Extra Tube Hold for Add Ons    Light Green Top    Collection Time: 07/19/22 10:42 PM   Result Value Ref Range    Extra Tube Hold for Add Ons    Lavender Top    Collection Time: 07/19/22 10:42 PM   Result Value Ref Range    Extra Tube Hold for Add Ons    Gold Top    Collection Time: 07/19/22 10:42 PM   Result Value Ref Range    Extra Tube Hold for Add Ons    Grey Top Tube    Collection Time: 07/19/22 10:42 PM   Result Value Ref Range    Extra Tube Hold for Add Ons    Basic Metabolic Panel    Collection Time: 07/19/22 10:42 PM   Result Value Ref Range    Fasting Status      Sodium 132 (L) 135 - 145 mmol/L    Potassium 4.6 3.4 - 5.1 mmol/L    Chloride 102 97 - 110 mmol/L    Carbon Dioxide 20 (L) 21 - 32 mmol/L    Anion Gap 15 7 - 19 mmol/L    Glucose 144 (H) 70 - 99 mg/dL    BUN 23 (H) 6 - 20 mg/dL    Creatinine 1.44 (H) 0.67 - 1.17 mg/dL    Glomerular Filtration Rate 38 (L) >=60    BUN/ Creatinine Ratio 16 7 - 25    Calcium 9.1 8.4 - 10.2 mg/dL   TROPONIN I, HIGH SENSITIVITY    Collection Time: 07/19/22 10:42 PM   Result Value Ref Range    Troponin I, High Sensitivity 282 (HH) <77 ng/L   Hepatic Function Panel    Collection Time:  07/19/22 10:42 PM   Result Value Ref Range    Albumin 3.1 (L) 3.6 - 5.1 g/dL    Bilirubin, Total 0.2 0.2 - 1.0 mg/dL    Bilirubin, Direct <0.1 0.0 - 0.2 mg/dL    Alkaline Phosphatase 56 45 - 117 Units/L    GPT/ (H) <64 Units/L    GOT/AST 59 (H) <=37 Units/L    Protein, Total 8.6 (H) 6.4 - 8.2 g/dL   Magnesium    Collection Time: 07/19/22 10:42 PM   Result Value Ref Range    Magnesium 2.0 1.7 - 2.4 mg/dL   Lipase    Collection Time: 07/19/22 10:42 PM   Result Value Ref Range    Lipase 85 73 - 393 Units/L   Procalcitonin    Collection Time: 07/19/22 10:42 PM   Result Value Ref Range    Procalcitonin 0.46 (H) <=0.09 ng/mL   CBC with Automated Differential (performable only)    Collection Time: 07/19/22 10:42 PM   Result Value Ref Range    WBC 6.3 4.2 - 11.0 K/mcL    RBC 3.52 (L) 4.50 - 5.90 mil/mcL    HGB 12.0 (L) 13.0 - 17.0 g/dL    HCT 36.1 (L) 39.0 - 51.0 %    .6 (H) 78.0 - 100.0 fl    MCH 34.1 (H) 26.0 - 34.0 pg    MCHC 33.2 32.0 - 36.5 g/dL    RDW-CV 14.5 11.0 - 15.0 %    RDW-SD 54.4 (H) 39.0 - 50.0 fL     140 - 450 K/mcL    NRBC 0 <=0 /100 WBC    Neutrophil, Percent 85 %    Lymphocytes, Percent 4 %    Mono, Percent 5 %    Eosinophils, Percent 5 %    Basophils, Percent 0 %    Immature Granulocytes 1 %    Absolute Neutrophils 5.4 1.8 - 7.7 K/mcL    Absolute Lymphocytes 0.3 (L) 1.0 - 4.0 K/mcL    Absolute Monocytes 0.3 0.3 - 0.9 K/mcL    Absolute Eosinophils  0.3 0.0 - 0.5 K/mcL    Absolute Basophils 0.0 0.0 - 0.3 K/mcL    Absolute Immmature Granulocytes 0.1 0.0 - 0.2 K/mcL   Lactic Acid Venous With Reflex    Collection Time: 07/19/22 10:42 PM   Result Value Ref Range    Lactate, Venous 2.7 (HH) 0.0 - 2.0 mmol/L   Electrocardiogram 12-Lead    Collection Time: 07/19/22 10:46 PM   Result Value Ref Range    Ventricular Rate EKG/Min (BPM) 89     Atrial Rate (BPM) 89     ME-Interval (MSEC) 218     QRS-Interval (MSEC) 122     QT-Interval (MSEC) 376     QTc 457     P Axis (Degrees) 20     R Axis  (Degrees) -22     T Axis (Degrees) 21     REPORT TEXT       Sinus rhythm  with 1st degree AV block  Right bundle branch block  Cannot rule out  Inferior infarct  , age undetermined  Abnormal ECG  Confirmed by DEACON BORGES JULIO CÉSAR ([id number]) on 7/20/2022 10:02:35 AM     Blood Culture    Collection Time: 07/19/22 11:44 PM    Specimen: Blood   Result Value Ref Range    Culture, Blood or Bone Marrow No Growth 5 Days.    COVID/Flu/RSV panel    Collection Time: 07/20/22 12:09 AM   Result Value Ref Range    Rapid SARS-COV-2 by PCR Not Detected Not Detected / Detected / Presumptive Positive / Inhibitors present    Influenza A by PCR Not Detected Not Detected    Influenza B by PCR Not Detected Not Detected    RSV BY PCR Not Detected Not Detected    Isolation Guidelines      Procedural Comment     Blood Culture    Collection Time: 07/20/22 12:10 AM    Specimen: Blood   Result Value Ref Range    Culture, Blood or Bone Marrow No Growth 5 Days.    Urinalysis & Reflex Microscopy With Culture If Indicated    Collection Time: 07/20/22 12:54 AM   Result Value Ref Range    COLOR, URINALYSIS Yellow     APPEARANCE, URINALYSIS Clear     GLUCOSE, URINALYSIS Negative Negative mg/dL    BILIRUBIN, URINALYSIS Negative Negative    KETONES, URINALYSIS Negative Negative mg/dL    SPECIFIC GRAVITY, URINALYSIS 1.018 1.005 - 1.030    OCCULT BLOOD, URINALYSIS Small (A) Negative    PH, URINALYSIS 5.0 5.0 - 7.0    PROTEIN, URINALYSIS Negative Negative mg/dL    UROBILINOGEN, URINALYSIS 0.2 0.2, 1.0 mg/dL    NITRITE, URINALYSIS Negative Negative    LEUKOCYTE ESTERASE, URINALYSIS Trace (A) Negative    SQUAMOUS EPITHELIAL, URINALYSIS 1 to 5 None Seen, 1 to 5 /hpf    ERYTHROCYTES, URINALYSIS 6 to 10 (A) None Seen, 1 to 2 /hpf    LEUKOCYTES, URINALYSIS 11 to 25 (A) None Seen, 1 to 5 /hpf    BACTERIA, URINALYSIS Few (A) None Seen /hpf    HYALINE CASTS, URINALYSIS None Seen None Seen, 1 to 5 /lpf    TRANSITIONAL EPITHELIALS 1 to 5 1 to 5, None Seen /hpf   Urine,  Bacterial Culture    Collection Time: 07/20/22 12:54 AM    Specimen: Urine, Catheterized - Wills   Result Value Ref Range    Urine, Bacterial Culture 10,000 TO 50,000 CFU/mL Enterococcus faecalis (A)        Susceptibility    Enterococcus faecalis - CRUZITO     PENICILLIN G* 1 Susceptible ug/mL      * Penicillin susceptibility predicts susceptibility to Ampicillin, Amoxicillin,Ampicillin/Sulbactam,and Amoxicillin/Clavulanic Acid.     AMPICILLIN <=2 Susceptible ug/mL     CIPROFLOXACIN >=8 Resistant ug/mL     LEVOFLOXACIN >=8 Resistant ug/mL     VANCOMYCIN 1 Susceptible ug/mL     NITROFURANTOIN <=16 Susceptible ug/mL   Lactic Acid, Venous    Collection Time: 07/20/22  1:39 AM   Result Value Ref Range    Lactate, Venous 2.5 (HH) 0.0 - 2.0 mmol/L   GLUCOSE, BEDSIDE - POINT OF CARE    Collection Time: 07/20/22 11:37 AM   Result Value Ref Range    GLUCOSE, BEDSIDE - POINT OF CARE 90 70 - 99 mg/dL   Comprehensive Metabolic Panel    Collection Time: 07/21/22  4:40 AM   Result Value Ref Range    Fasting Status      Sodium 139 135 - 145 mmol/L    Potassium 4.3 3.4 - 5.1 mmol/L    Chloride 110 97 - 110 mmol/L    Carbon Dioxide 23 21 - 32 mmol/L    Anion Gap 10 7 - 19 mmol/L    Glucose 85 70 - 99 mg/dL    BUN 15 6 - 20 mg/dL    Creatinine 1.18 (H) 0.67 - 1.17 mg/dL    Glomerular Filtration Rate 66 >=60    BUN/ Creatinine Ratio 13 7 - 25    Calcium 8.3 (L) 8.4 - 10.2 mg/dL    Bilirubin, Total 0.2 0.2 - 1.0 mg/dL    GOT/AST 54 (H) <=37 Units/L    GPT/ALT 76 (H) <64 Units/L    Alkaline Phosphatase 44 (L) 45 - 117 Units/L    Albumin 2.6 (L) 3.6 - 5.1 g/dL    Protein, Total 6.9 6.4 - 8.2 g/dL    Globulin 4.3 (H) 2.0 - 4.0 g/dL    A/G Ratio 0.6 (L) 1.0 - 2.4   CBC with Automated Differential (performable only)    Collection Time: 07/21/22  4:40 AM   Result Value Ref Range    WBC 4.0 (L) 4.2 - 11.0 K/mcL    RBC 3.37 (L) 4.50 - 5.90 mil/mcL    HGB 11.1 (L) 13.0 - 17.0 g/dL    HCT 35.6 (L) 39.0 - 51.0 %    .6 (H) 78.0 - 100.0 fl     MCH 32.9 26.0 - 34.0 pg    MCHC 31.2 (L) 32.0 - 36.5 g/dL    RDW-CV 14.6 11.0 - 15.0 %    RDW-SD 57.1 (H) 39.0 - 50.0 fL     140 - 450 K/mcL    NRBC 0 <=0 /100 WBC   Manual Differential    Collection Time: 07/21/22  4:40 AM   Result Value Ref Range    Neutrophil, Percent 54 %    Lymphocytes, Percent 35 %    Mono, Percent 4 %    Eosinophils, Percent 7 %    Basophils, Percent 0 %    Absolute Neutrophil 2.2 1.8 - 7.7 K/mcL    Absolute Lymphocytes 1.4 1.0 - 4.0 K/mcL    Absolute Monocytes 0.2 (L) 0.3 - 0.9 K/mcL    Absolute Eosinophils 0.3 0.0 - 0.5 K/mcL    Absolute Basophils 0.0 0.0 - 0.3 K/mcL    RBC Morphology Normal Normal    WBC Morphology Normal Normal    Platelet Morphology Normal Normal   CBC with Automated Differential (performable only)    Collection Time: 07/22/22  3:45 AM   Result Value Ref Range    WBC 4.0 (L) 4.2 - 11.0 K/mcL    RBC 3.13 (L) 4.50 - 5.90 mil/mcL    HGB 10.6 (L) 13.0 - 17.0 g/dL    HCT 33.1 (L) 39.0 - 51.0 %    .8 (H) 78.0 - 100.0 fl    MCH 33.9 26.0 - 34.0 pg    MCHC 32.0 32.0 - 36.5 g/dL    RDW-CV 14.6 11.0 - 15.0 %    RDW-SD 56.7 (H) 39.0 - 50.0 fL     140 - 450 K/mcL    NRBC 0 <=0 /100 WBC   Manual Differential    Collection Time: 07/22/22  3:45 AM   Result Value Ref Range    Neutrophil, Percent 36 %    Lymphocytes, Percent 50 %    Mono, Percent 2 %    Eosinophils, Percent 10 %    Basophils, Percent 2 %    Absolute Neutrophil 1.4 (L) 1.8 - 7.7 K/mcL    Absolute Lymphocytes 2.0 1.0 - 4.0 K/mcL    Absolute Monocytes 0.1 (L) 0.3 - 0.9 K/mcL    Absolute Eosinophils 0.4 0.0 - 0.5 K/mcL    Absolute Basophils 0.1 0.0 - 0.3 K/mcL    WBC Morphology Normal Normal    [place] Cells Few     Platelet Morphology Normal Normal   Comprehensive Metabolic Panel    Collection Time: 07/22/22  3:46 AM   Result Value Ref Range    Fasting Status      Sodium 141 135 - 145 mmol/L    Potassium 4.2 3.4 - 5.1 mmol/L    Chloride 112 (H) 97 - 110 mmol/L    Carbon Dioxide 22 21 - 32 mmol/L    Anion  Gap 11 7 - 19 mmol/L    Glucose 86 70 - 99 mg/dL    BUN 12 6 - 20 mg/dL    Creatinine 0.94 0.67 - 1.17 mg/dL    Glomerular Filtration Rate 87 >=60    BUN/ Creatinine Ratio 13 7 - 25    Calcium 8.8 8.4 - 10.2 mg/dL    Bilirubin, Total 0.1 (L) 0.2 - 1.0 mg/dL    GOT/AST 51 (H) <=37 Units/L    GPT/ALT 70 (H) <64 Units/L    Alkaline Phosphatase 45 45 - 117 Units/L    Albumin 2.5 (L) 3.6 - 5.1 g/dL    Protein, Total 6.7 6.4 - 8.2 g/dL    Globulin 4.2 (H) 2.0 - 4.0 g/dL    A/G Ratio 0.6 (L) 1.0 - 2.4   Comprehensive Metabolic Panel    Collection Time: 07/23/22  4:36 AM   Result Value Ref Range    Fasting Status      Sodium 141 135 - 145 mmol/L    Potassium 4.0 3.4 - 5.1 mmol/L    Chloride 113 (H) 97 - 110 mmol/L    Carbon Dioxide 22 21 - 32 mmol/L    Anion Gap 10 7 - 19 mmol/L    Glucose 88 70 - 99 mg/dL    BUN 10 6 - 20 mg/dL    Creatinine 0.86 0.67 - 1.17 mg/dL    Glomerular Filtration Rate >90 >=60    BUN/ Creatinine Ratio 12 7 - 25    Calcium 8.5 8.4 - 10.2 mg/dL    Bilirubin, Total 0.2 0.2 - 1.0 mg/dL    GOT/AST 44 (H) <=37 Units/L    GPT/ALT 65 (H) <64 Units/L    Alkaline Phosphatase 44 (L) 45 - 117 Units/L    Albumin 2.4 (L) 3.6 - 5.1 g/dL    Protein, Total 6.4 6.4 - 8.2 g/dL    Globulin 4.0 2.0 - 4.0 g/dL    A/G Ratio 0.6 (L) 1.0 - 2.4   CBC with Automated Differential (performable only)    Collection Time: 07/23/22  4:36 AM   Result Value Ref Range    WBC 4.8 4.2 - 11.0 K/mcL    RBC 2.97 (L) 4.50 - 5.90 mil/mcL    HGB 9.8 (L) 13.0 - 17.0 g/dL    HCT 31.7 (L) 39.0 - 51.0 %    .7 (H) 78.0 - 100.0 fl    MCH 33.0 26.0 - 34.0 pg    MCHC 30.9 (L) 32.0 - 36.5 g/dL    RDW-CV 14.5 11.0 - 15.0 %    RDW-SD 55.8 (H) 39.0 - 50.0 fL     140 - 450 K/mcL    NRBC 0 <=0 /100 WBC   Manual Differential    Collection Time: 07/23/22  4:36 AM   Result Value Ref Range    Neutrophil, Percent 50 %    Lymphocytes, Percent 40 %    Mono, Percent 6 %    Eosinophils, Percent 3 %    Basophils, Percent 0 %    Reactive Lymphocytes,  Percent 1 0 - 5 %    Absolute Neutrophil 2.4 1.8 - 7.7 K/mcL    Absolute Lymphocytes 2.0 1.0 - 4.0 K/mcL    Absolute Monocytes 0.3 0.3 - 0.9 K/mcL    Absolute Eosinophils 0.1 0.0 - 0.5 K/mcL    Absolute Basophils 0.0 0.0 - 0.3 K/mcL    RBC Morphology Normal Normal    WBC Morphology Normal Normal    Macrocytosis Few     Platelet Morphology Normal Normal       Physical Exam  Constitutional:       Comments: Well groomed male seated in w/c. Alert. NAD   HENT:      Head:      Comments: Healed scar to R side of head from previous craniotomy     Mouth/Throat:      Mouth: Mucous membranes are moist.   Cardiovascular:      Rate and Rhythm: Normal rate and regular rhythm.      Pulses: Normal pulses.      Heart sounds: Normal heart sounds.   Pulmonary:      Effort: Pulmonary effort is normal. No respiratory distress.      Breath sounds: Normal breath sounds.   Abdominal:      General: Bowel sounds are normal. There is no distension.      Palpations: Abdomen is soft.   Genitourinary:     Comments: Catheter draining clear yellow urine  Musculoskeletal:      Cervical back: Neck supple.      Right lower leg: No edema.      Left lower leg: No edema.      Comments: Dense R sided deficit U&L   Skin:     General: Skin is warm and dry.   Neurological:      Comments: Non verbal       Prognosis: fair    High risk of death within one year? unable to determine    Basis for estimate:chart review and clinical assessment    End of Life Discussion completed with the Pt/family: yes    Planned Discharge Disposition: Home      ASSESSMENT/PLAN  1. Chronic obstructive pulmonary disease, unspecified COPD type (CMS/HCC)  -chronic, stable at present  -cont 02, nebs  -f/u pulmo as planned 10/3/22 - Dr Bains ([place])  -monitor for increased dyspnea, cough, sputum production  -supportive care, aerobike    2. Hemiplegia and hemiparesis following cerebral infarction affecting right dominant side (CMS/HCC)  3. Late effects of CVA (cerebrovascular  accident)  -chronic  -safety, fall precautions  -cont asa, statin, pletal  -24h CG support via wife/family  -activity/PT exercises as tolerated    4. Seizure disorder (CMS/HCC)  -cont Keppra  -f/u neurology as indicated    5. Atherosclerosis of coronary artery bypass graft of native heart without angina pectoris  6. Atherosclerosis of aorta (CMS/HCC)  7. Peripheral vascular disease, unspecified (CMS/HCC)  -asa, statin, pletal, gabapentin  -f/u cardiology Dr Hamilton as planned    8. Palliative care encounter  9. ACP (advance care planning)    Patient and/or decision maker consent to Advance Care Planning visit.    Inclusion criteria: Multiple admissions.    Purpose of the Meeting:  Advance Care Planning and Goals of Care discussion    Is the patient capable of making healthcare decisions: No, the patient already has an activated Power of  for Health Care form.   Decision Maker: wife - she will provide copy for scanning at next appt     Does the patient have an Advance Care Directive document in Epic? Yes, it is current and updated. It is the most recent version, signed and dated by the patient, and witnessed by two non-family/non-caregiver signatures.    After code status discussion, the patient and Power of  for Health Care has decided on: Full Resuscitation     Goals of Care:  Patient has one or more life-limiting conditions: Yes    A discussion of the patient's Goals of Care has been completed with patient and Power of  for Health Care regarding the following:  (1) Current Serious Conditions: Moderate to Severe COPD  (2) Prognosis: FAIR     Function: Decline in function and Decline in mobility  (3) Patient-Centered Goals: Aggressive, usual medical care  (4) Plan for Future Deterioration: Would be ok with returning to hospital for care.    The patient and Power of  for Health Care verbalized understanding of the above discussion.    The following additional care is recommended:  Palliative Home Care    Total Time spent in conversation and coordination of care: 30m    Opiate/Pain Management Agreement: No  Date Competed: n/a     Total combined time for today's encounter was 90 minutes, with > 50% of that time spent counseling and coordinating care regarding the above.   PCP updated re: today's encounter via PS  Follow up palliative appt scheduled for 9/27; contact info provided & wife urged to call with concerns/questions    Thank you for involving Palliative Medicine at Home.  Please contact the covering provider via [employer] with further questions or concerns.      Geneva Bobby, CNP   atraumatic/motor intact

## 2024-10-22 NOTE — ED PROVIDER NOTE - PATIENT PORTAL LINK FT
You can access the FollowMyHealth Patient Portal offered by [facility] by registering at the following website: [email or web address]. By joining [employer]’s FollowMyHealth portal, you will also be able to view your health information using other applications (apps) compatible with our system.

## 2025-05-19 NOTE — ED PROVIDER NOTE - NSDCPRINTRESULTS_ED_ALL_ED
Regular solids (cut into small pieces and moistened), Thin liquids (via small, single sips) Patient requests all Lab, Cardiology, and Radiology Results on their Discharge Instructions